# Patient Record
Sex: FEMALE | Race: WHITE | NOT HISPANIC OR LATINO | Employment: OTHER | URBAN - METROPOLITAN AREA
[De-identification: names, ages, dates, MRNs, and addresses within clinical notes are randomized per-mention and may not be internally consistent; named-entity substitution may affect disease eponyms.]

---

## 2017-11-07 ENCOUNTER — TRANSCRIBE ORDERS (OUTPATIENT)
Dept: MAMMOGRAPHY | Facility: HOSPITAL | Age: 67
End: 2017-11-07

## 2017-11-07 DIAGNOSIS — Z12.31 VISIT FOR SCREENING MAMMOGRAM: Primary | ICD-10-CM

## 2017-12-13 ENCOUNTER — HOSPITAL ENCOUNTER (OUTPATIENT)
Dept: MAMMOGRAPHY | Facility: HOSPITAL | Age: 67
Discharge: HOME | End: 2017-12-13
Attending: OBSTETRICS & GYNECOLOGY | Admitting: OBSTETRICS & GYNECOLOGY

## 2017-12-13 DIAGNOSIS — Z12.31 VISIT FOR SCREENING MAMMOGRAM: ICD-10-CM

## 2017-12-13 PROCEDURE — G0202 SCR MAMMO BI INCL CAD: HCPCS

## 2017-12-13 PROCEDURE — 77063 BREAST TOMOSYNTHESIS BI: CPT

## 2017-12-15 PROCEDURE — G0202 SCR MAMMO BI INCL CAD: HCPCS | Performed by: RADIOLOGY

## 2017-12-15 PROCEDURE — 77063 BREAST TOMOSYNTHESIS BI: CPT | Performed by: RADIOLOGY

## 2018-11-13 ENCOUNTER — TRANSCRIBE ORDERS (OUTPATIENT)
Dept: ADMINISTRATIVE | Facility: HOSPITAL | Age: 68
End: 2018-11-13

## 2018-11-13 DIAGNOSIS — Z12.31 VISIT FOR SCREENING MAMMOGRAM: Primary | ICD-10-CM

## 2019-01-02 ENCOUNTER — APPOINTMENT (OUTPATIENT)
Dept: MAMMOGRAPHY | Facility: HOSPITAL | Age: 69
End: 2019-01-02
Attending: FAMILY MEDICINE

## 2019-01-07 ENCOUNTER — TRANSCRIBE ORDERS (OUTPATIENT)
Dept: ADMINISTRATIVE | Facility: HOSPITAL | Age: 69
End: 2019-01-07

## 2019-01-07 DIAGNOSIS — Z12.31 VISIT FOR SCREENING MAMMOGRAM: Primary | ICD-10-CM

## 2019-02-14 RX ORDER — SODIUM, POTASSIUM,MAG SULFATES 17.5-3.13G
SOLUTION, RECONSTITUTED, ORAL ORAL
Qty: 2 BOTTLE | Refills: 0 | Status: SHIPPED | OUTPATIENT
Start: 2019-02-14 | End: 2020-03-24

## 2019-02-18 ENCOUNTER — HOSPITAL ENCOUNTER (OUTPATIENT)
Dept: MAMMOGRAPHY | Facility: HOSPITAL | Age: 69
Discharge: HOME | End: 2019-02-18
Attending: FAMILY MEDICINE | Admitting: FAMILY MEDICINE

## 2019-02-18 DIAGNOSIS — Z12.31 VISIT FOR SCREENING MAMMOGRAM: ICD-10-CM

## 2019-02-18 PROCEDURE — 77063 BREAST TOMOSYNTHESIS BI: CPT | Performed by: RADIOLOGY

## 2019-02-18 PROCEDURE — 77067 SCR MAMMO BI INCL CAD: CPT

## 2019-02-18 PROCEDURE — 77067 SCR MAMMO BI INCL CAD: CPT | Performed by: RADIOLOGY

## 2019-02-18 PROCEDURE — 77063 BREAST TOMOSYNTHESIS BI: CPT

## 2019-03-01 ENCOUNTER — OUTSIDE FACILITY SERVICE (OUTPATIENT)
Dept: GASTROENTEROLOGY | Facility: CLINIC | Age: 69
End: 2019-03-01

## 2019-03-01 ENCOUNTER — LAB REQUISITION (OUTPATIENT)
Dept: LAB | Facility: HOSPITAL | Age: 69
End: 2019-03-01

## 2019-03-01 DIAGNOSIS — Z12.11 ENCOUNTER FOR SCREENING FOR MALIGNANT NEOPLASM OF COLON: ICD-10-CM

## 2019-03-01 PROCEDURE — 88305 TISSUE EXAM BY PATHOLOGIST: CPT | Performed by: INTERNAL MEDICINE

## 2019-03-01 PROCEDURE — 45385 COLONOSCOPY W/LESION REMOVAL: CPT | Performed by: INTERNAL MEDICINE

## 2019-03-04 LAB
CYTO UR: NORMAL
LAB AP CASE REPORT: NORMAL
LAB AP CLINICAL INFORMATION: NORMAL
PATH REPORT.FINAL DX SPEC: NORMAL
PATH REPORT.GROSS SPEC: NORMAL

## 2019-11-19 ENCOUNTER — HOSPITAL ENCOUNTER (OUTPATIENT)
Dept: GENERAL RADIOLOGY | Facility: HOSPITAL | Age: 69
Discharge: HOME | End: 2019-11-19
Admitting: FAMILY MEDICINE

## 2019-11-19 ENCOUNTER — TRANSCRIBE ORDERS (OUTPATIENT)
Dept: ADMINISTRATIVE | Facility: HOSPITAL | Age: 69
End: 2019-11-19

## 2019-11-19 DIAGNOSIS — R06.00 DYSPNEA AND RESPIRATORY ABNORMALITIES: Primary | ICD-10-CM

## 2019-11-19 DIAGNOSIS — R06.89 DYSPNEA AND RESPIRATORY ABNORMALITIES: Primary | ICD-10-CM

## 2019-11-19 PROCEDURE — 71046 X-RAY EXAM CHEST 2 VIEWS: CPT

## 2020-01-17 ENCOUNTER — TRANSCRIBE ORDERS (OUTPATIENT)
Dept: ADMINISTRATIVE | Facility: HOSPITAL | Age: 70
End: 2020-01-17

## 2020-01-17 DIAGNOSIS — Z12.31 VISIT FOR SCREENING MAMMOGRAM: Primary | ICD-10-CM

## 2020-03-18 ENCOUNTER — APPOINTMENT (OUTPATIENT)
Dept: MAMMOGRAPHY | Facility: HOSPITAL | Age: 70
End: 2020-03-18

## 2020-03-24 ENCOUNTER — OFFICE VISIT (OUTPATIENT)
Dept: ENDOCRINOLOGY | Facility: CLINIC | Age: 70
End: 2020-03-24

## 2020-03-24 VITALS
OXYGEN SATURATION: 98 % | WEIGHT: 126 LBS | BODY MASS INDEX: 24.74 KG/M2 | DIASTOLIC BLOOD PRESSURE: 60 MMHG | SYSTOLIC BLOOD PRESSURE: 100 MMHG | HEIGHT: 60 IN | HEART RATE: 77 BPM

## 2020-03-24 DIAGNOSIS — E89.0 POSTABLATIVE HYPOTHYROIDISM: Primary | ICD-10-CM

## 2020-03-24 DIAGNOSIS — M81.0 AGE-RELATED OSTEOPOROSIS WITHOUT CURRENT PATHOLOGICAL FRACTURE: ICD-10-CM

## 2020-03-24 LAB
25(OH)D3 SERPL-MCNC: 74.7 NG/ML (ref 30–100)
ALBUMIN SERPL-MCNC: 4.9 G/DL (ref 3.5–5.2)
ANION GAP SERPL CALCULATED.3IONS-SCNC: 13.3 MMOL/L (ref 5–15)
BUN BLD-MCNC: 19 MG/DL (ref 8–23)
BUN/CREAT SERPL: 27.1 (ref 7–25)
CALCIUM SPEC-SCNC: 10.3 MG/DL (ref 8.6–10.5)
CHLORIDE SERPL-SCNC: 101 MMOL/L (ref 98–107)
CO2 SERPL-SCNC: 25.7 MMOL/L (ref 22–29)
CREAT BLD-MCNC: 0.7 MG/DL (ref 0.57–1)
GFR SERPL CREATININE-BSD FRML MDRD: 83 ML/MIN/1.73
GLUCOSE BLD-MCNC: 109 MG/DL (ref 65–99)
PHOSPHATE SERPL-MCNC: 3.6 MG/DL (ref 2.5–4.5)
POTASSIUM BLD-SCNC: 4.7 MMOL/L (ref 3.5–5.2)
PTH-INTACT SERPL-MCNC: 20.9 PG/ML (ref 15–65)
SODIUM BLD-SCNC: 140 MMOL/L (ref 136–145)
TSH SERPL DL<=0.05 MIU/L-ACNC: 0.58 UIU/ML (ref 0.27–4.2)

## 2020-03-24 PROCEDURE — 99204 OFFICE O/P NEW MOD 45 MIN: CPT | Performed by: INTERNAL MEDICINE

## 2020-03-24 PROCEDURE — 84443 ASSAY THYROID STIM HORMONE: CPT | Performed by: INTERNAL MEDICINE

## 2020-03-24 PROCEDURE — 83970 ASSAY OF PARATHORMONE: CPT | Performed by: INTERNAL MEDICINE

## 2020-03-24 PROCEDURE — 80069 RENAL FUNCTION PANEL: CPT | Performed by: INTERNAL MEDICINE

## 2020-03-24 PROCEDURE — 82306 VITAMIN D 25 HYDROXY: CPT | Performed by: INTERNAL MEDICINE

## 2020-03-24 RX ORDER — CHLORAL HYDRATE 500 MG
CAPSULE ORAL
COMMUNITY
End: 2022-10-03

## 2020-03-24 RX ORDER — AMLODIPINE BESYLATE 5 MG/1
TABLET ORAL
COMMUNITY
Start: 2020-03-04

## 2020-03-24 RX ORDER — SIMVASTATIN 20 MG
TABLET ORAL
COMMUNITY
Start: 2020-03-17 | End: 2021-03-29

## 2020-03-24 RX ORDER — CARVEDILOL 6.25 MG/1
TABLET ORAL
COMMUNITY
Start: 2020-02-25

## 2020-03-24 RX ORDER — LEVOTHYROXINE SODIUM 75 MCG
TABLET ORAL
COMMUNITY
Start: 2020-03-14 | End: 2020-03-27 | Stop reason: SDUPTHER

## 2020-03-24 RX ORDER — KETOCONAZOLE 20 MG/ML
SHAMPOO TOPICAL
COMMUNITY
Start: 2020-03-09 | End: 2021-09-03

## 2020-03-24 NOTE — PROGRESS NOTES
Chief Complaint   Patient presents with   • Hypothyroidism     Referred by Dr. Corby Cash    • Osteoporosis       HPI:   Keke Rodriguez is a 69 y.o.female referred by Corby Cash MD for further evaluation and management of her hypothyroidism and osteoporosis. Her history is as follows:    1) post-ablative hypothyroidism:  - diagnosed with Graves disease in her 40's. Eventually treated with PURVIS ablation after a trial of thionamides  - Has been taking Brand Synthroid for several years. Did not tolerate generic levothyroxine.     Had influenza vaccine in 09/2019. Labs 11/08/2019 were: TSH 0.07, free T4 1.92 (H) on Synthroid 88 mcg (did not feel well on this dose). Her dose was decreased to Synthroid 75 mcg.   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   - Due to concerns about jaw osteonecrosis, pt did not start Fosamax in 01/2019. Instead, has increased her calcium, vit D intake and has increased her weight bearing exercise over the past year.     Date L1 - L4 Total Hip, L Neck, L Total Hip, R Neck, R forearm FRAX   01/2019 T: -1.3 T: - 1.6 T: - 2.5 T: - 1.6 T: - 2.5  Major: 13.8%  Hip: 3.2%                         Other history:   - menopause began in her late 40's. Had hysterectomy and single oophorectomy in her 40's  - FMHx: pt's sister has osteoporosis  - no h/o fracture  - non-smoker     Review of Systems   Constitutional: Positive for fatigue.   HENT: Negative.    Eyes: Negative.    Respiratory:  Negative.    Cardiovascular: Negative.    Gastrointestinal: Negative.    Endocrine: Negative.    Genitourinary: Negative.    Musculoskeletal: Positive for arthralgias.   Skin: Negative.    Allergic/Immunologic: Negative.    Neurological: Negative.    Hematological: Negative.    Psychiatric/Behavioral: Negative.        Past Medical History:   Diagnosis Date   • Arthritis    • Dry skin    • H/O Graves' disease    • Hypertension    • Postablative hypothyroidism    • Romberg disease     Left    • Vision disorder     cataracts     family history includes Arthritis in her mother; Breast cancer (age of onset: 45) in her niece; Hyperlipidemia in her mother; Hypertension in her father; Osteoporosis in her sister; Stroke in her father; Thyroid disease in her mother.  Past Surgical History:   Procedure Laterality Date   • OOPHORECTOMY      UNILATERAL   • OTHER SURGICAL HISTORY Left     multiple facial reconstructions durning lifetime due to Tree rombergs dx , left   • TONSILLECTOMY      as a child      Social History     Tobacco Use   • Smoking status: Never Smoker   • Smokeless tobacco: Never Used   Substance Use Topics   • Alcohol use: Yes     Comment: social    • Drug use: Never     Outpatient Medications Prior to Visit   Medication Sig Dispense Refill   • amLODIPine (NORVASC) 5 MG tablet      • calcium citrate-vitamin d (CITRACAL) 200-250 MG-UNIT tablet tablet Take  by mouth Daily.     • carvedilol (COREG) 6.25 MG tablet      • Cholecalciferol (VITAMIN D-3 PO) Take 4,000 Units by mouth Daily.     • Glucosamine-MSM-Hyaluronic Acd (JOINT HEALTH PO) Take  by mouth.     • ketoconazole (NIZORAL) 2 % shampoo      • Omega-3 Fatty Acids (FISH OIL) 1000 MG capsule capsule Take  by mouth Daily With Breakfast.     • Red Yeast Rice Extract (RED YEAST RICE PO) Take  by mouth.     • simvastatin (ZOCOR) 20 MG tablet      • SYNTHROID 75 MCG tablet      • sodium-potassium-magnesium sulfates (SUPREP BOWEL PREP KIT) 17.5-3.13-1.6 GM/177ML  "solution oral solution Please follow the directions mailed to you by our office. If you have any questions call our office at [phone] 2 bottle 0     No facility-administered medications prior to visit.      Allergies   Allergen Reactions   • Contrast Dye Swelling     Pt cant have betadine    • Doxycycline Nausea Only     Pt also get high blood pressure    • Hydrochlorothiazide Swelling   • Statins Other (See Comments)     Pt has coughing, swelling in tongue, sensitivities , and severe muscle cramps   • Penicillins Rash       /60   Pulse 77   Ht 152.4 cm (60\")   Wt 57.2 kg (126 lb)   SpO2 98%   BMI 24.61 kg/m²   Physical Exam   Constitutional: She is oriented to person, place, and time. She appears well-developed. No distress.   HENT:   Head: Normocephalic.   Mouth/Throat: Oropharynx is clear and moist.   Left jaw s/p multiple reconstruction surgeries   Eyes: Pupils are equal, round, and reactive to light. Conjunctivae and EOM are normal.   Neck: No tracheal deviation present. No thyromegaly present.   No palpable thyroid nodules     Cardiovascular: Normal rate, regular rhythm and normal heart sounds.   No murmur heard.  Pulmonary/Chest: Effort normal and breath sounds normal. No respiratory distress.   Abdominal: Soft. Bowel sounds are normal. She exhibits no mass. There is no tenderness.   Lymphadenopathy:     She has no cervical adenopathy.   Neurological: She is alert and oriented to person, place, and time. No cranial nerve deficit.   Skin: Skin is warm and dry. She is not diaphoretic. No erythema.   Psychiatric: She has a normal mood and affect. Her behavior is normal.   Vitals reviewed.      LABS/IMAGING: outside records reviewed and summarized in HPI  Results for orders placed or performed in visit on 03/24/20   TSH   Result Value Ref Range    TSH 0.582 0.270 - 4.200 uIU/mL   PTH, Intact   Result Value Ref Range    PTH, Intact 20.9 15.0 - 65.0 pg/mL   Renal Function Panel   Result Value Ref " Range    Glucose 109 (H) 65 - 99 mg/dL    BUN 19 8 - 23 mg/dL    Creatinine 0.70 0.57 - 1.00 mg/dL    Sodium 140 136 - 145 mmol/L    Potassium 4.7 3.5 - 5.2 mmol/L    Chloride 101 98 - 107 mmol/L    CO2 25.7 22.0 - 29.0 mmol/L    Calcium 10.3 8.6 - 10.5 mg/dL    Albumin 4.90 3.50 - 5.20 g/dL    Phosphorus 3.6 2.5 - 4.5 mg/dL    Anion Gap 13.3 5.0 - 15.0 mmol/L    BUN/Creatinine Ratio 27.1 (H) 7.0 - 25.0    eGFR Non African Amer 83 >60 mL/min/1.73   Vitamin D 25 Hydroxy   Result Value Ref Range    25 Hydroxy, Vitamin D 74.7 30.0 - 100.0 ng/ml     ASSESSMENT/PLAN:    1) hypothyroidism due to Hashimoto's thyroiditis:   - clinically euthyroid on exam  - TSH checked today WNL  - Will continue Brand Synthroid 75 mcg daily. Have sent Rx through Synthroid Direct mail order program for reduced cost.   - Reviewed proper thyroid hormone administration, and factors to avoid that decrease medication potency and medication absorption.     2) Age-related osteoporosis without current fracture:  - Reviewed pt's DEXA scan from 01/2019 with patient. Although treatment with a bisphosphonate was recommended at that time to her by her PCP, she was hesitant to start medication at that time given her h/o Romberg's disease and the potential risk (although very low) for osteonecrosis on the jaw. She instead has increased her calcium intake, consistently taken vitamin D3, and has increased her weight-bearing exercise over the past year.   - I have checked her renal function panel, PTH and vitamin D 25-OH level today to rule out secondary causes of osteoporosis. The is no evidence of primary hyperparathyroidism.   - I have asked her to decrease her vit D supplement to 2000 IU daily (has been taking 4000 IU daily)  - She is to continue her current calcium regimen (dietary sources plus supplement)  - I've asked her to have a repeat BMD at the same facility this year to assess if her current regimen of weight-bearing exercise and calcium/vit D  supplementation has helped. After review of the imaging, we will discuss treatment options.     Pt scheduled to RTC in 6 months    Signed: Gaye Alejandra MD          Answers for HPI/ROS submitted by the patient on 3/17/2020   What is the primary reason for your visit?: Other  Please describe your symptoms.: Thyroid management--thyroid ablation 18+years ago., Would also like to discuss bone scan results  Have you had these symptoms before?: Yes  How long have you been having these symptoms?: Other  Please list any medications you are currently taking for this condition.: Synthroid 75 mcg tab. 1 daily, Amlodipine besylate 5 mg tab. 1 a.m., Carvedilol 6.25 mg tab 1 p.m.,, Simvastatin 20 mg tab. 1 tab 3 days a week (Mon, Wed, Fri), Vitamin D3 2,000 units 2 caps daily,, , SUPPLEMENTS: Red yeast rice, 1 tab 4 days a week (Tues, Thurs, Sat, Sun),  Omega -3 Fish OIl,, Calcium Magnesium Citrate plus Vit D3,  Baxyl liquid hyaluronan supplement  Please describe any probable cause for these symptoms. : thyroid ablation, , normal bones loss due to aging

## 2020-03-27 ENCOUNTER — TELEPHONE (OUTPATIENT)
Dept: ENDOCRINOLOGY | Facility: CLINIC | Age: 70
End: 2020-03-27

## 2020-03-27 RX ORDER — LEVOTHYROXINE SODIUM 75 MCG
75 TABLET ORAL DAILY
Qty: 90 TABLET | Refills: 3 | Status: SHIPPED | OUTPATIENT
Start: 2020-03-27 | End: 2020-04-03 | Stop reason: SDUPTHER

## 2020-03-27 NOTE — TELEPHONE ENCOUNTER
Spoke to patient about lab results. See clinic note from 03/24/2020 for details.   Gaye Alejandra MD

## 2020-03-31 PROBLEM — M81.0 AGE-RELATED OSTEOPOROSIS WITHOUT CURRENT PATHOLOGICAL FRACTURE: Status: ACTIVE | Noted: 2020-03-31

## 2020-04-03 ENCOUNTER — TELEPHONE (OUTPATIENT)
Dept: ENDOCRINOLOGY | Facility: CLINIC | Age: 70
End: 2020-04-03

## 2020-04-03 DIAGNOSIS — E89.0 POSTABLATIVE HYPOTHYROIDISM: ICD-10-CM

## 2020-04-03 RX ORDER — LEVOTHYROXINE SODIUM 75 MCG
75 TABLET ORAL DAILY
Qty: 90 TABLET | Refills: 3 | Status: SHIPPED | OUTPATIENT
Start: 2020-04-03 | End: 2021-03-23

## 2020-04-03 NOTE — TELEPHONE ENCOUNTER
Spoke to patient. Informed her I had already sent the Rx. Have sent another prescription again.   Signed: Gaye Alejandra MD

## 2020-05-07 ENCOUNTER — HOSPITAL ENCOUNTER (OUTPATIENT)
Dept: MAMMOGRAPHY | Facility: HOSPITAL | Age: 70
Discharge: HOME | End: 2020-05-07
Admitting: FAMILY MEDICINE

## 2020-05-07 DIAGNOSIS — Z12.31 VISIT FOR SCREENING MAMMOGRAM: ICD-10-CM

## 2020-05-07 PROCEDURE — 77063 BREAST TOMOSYNTHESIS BI: CPT

## 2020-05-07 PROCEDURE — 77067 SCR MAMMO BI INCL CAD: CPT

## 2020-05-07 PROCEDURE — 77067 SCR MAMMO BI INCL CAD: CPT | Performed by: RADIOLOGY

## 2020-05-07 PROCEDURE — 77063 BREAST TOMOSYNTHESIS BI: CPT | Performed by: RADIOLOGY

## 2020-05-11 ENCOUNTER — APPOINTMENT (OUTPATIENT)
Dept: MAMMOGRAPHY | Facility: HOSPITAL | Age: 70
End: 2020-05-11

## 2020-05-15 ENCOUNTER — HOSPITAL ENCOUNTER (OUTPATIENT)
Dept: MAMMOGRAPHY | Facility: HOSPITAL | Age: 70
Discharge: HOME | End: 2020-05-15
Admitting: RADIOLOGY

## 2020-05-15 DIAGNOSIS — R92.8 ABNORMAL MAMMOGRAM: ICD-10-CM

## 2020-05-15 PROCEDURE — 77065 DX MAMMO INCL CAD UNI: CPT

## 2020-05-15 PROCEDURE — 77065 DX MAMMO INCL CAD UNI: CPT | Performed by: RADIOLOGY

## 2020-05-15 PROCEDURE — G0279 TOMOSYNTHESIS, MAMMO: HCPCS

## 2020-05-15 PROCEDURE — G0279 TOMOSYNTHESIS, MAMMO: HCPCS | Performed by: RADIOLOGY

## 2020-09-04 ENCOUNTER — TELEPHONE (OUTPATIENT)
Dept: ENDOCRINOLOGY | Facility: CLINIC | Age: 70
End: 2020-09-04

## 2020-09-04 DIAGNOSIS — M81.0 AGE-RELATED OSTEOPOROSIS WITHOUT CURRENT PATHOLOGICAL FRACTURE: Primary | ICD-10-CM

## 2020-09-04 DIAGNOSIS — E89.0 POSTABLATIVE HYPOTHYROIDISM: ICD-10-CM

## 2020-09-04 NOTE — TELEPHONE ENCOUNTER
PLease let patient know that labs have been ordered. She can complete any time the week before her appointment. She can come to our clinic or any of the  lab drawn stations.  Thanks  MD

## 2020-09-04 NOTE — TELEPHONE ENCOUNTER
PATIENT HAS APPT SCHEDULED ON 9/28/2020. DO YOU WANT HER TO HAVE LABS DRAWN PRIOR TO THIS APPT?    CALL BACK [phone]

## 2020-09-18 ENCOUNTER — LAB (OUTPATIENT)
Dept: ENDOCRINOLOGY | Facility: CLINIC | Age: 70
End: 2020-09-18

## 2020-09-18 ENCOUNTER — LAB (OUTPATIENT)
Dept: LAB | Facility: HOSPITAL | Age: 70
End: 2020-09-18

## 2020-09-18 DIAGNOSIS — E89.0 POSTABLATIVE HYPOTHYROIDISM: ICD-10-CM

## 2020-09-18 DIAGNOSIS — M81.0 AGE-RELATED OSTEOPOROSIS WITHOUT CURRENT PATHOLOGICAL FRACTURE: ICD-10-CM

## 2020-09-18 LAB
25(OH)D3 SERPL-MCNC: 106 NG/ML (ref 30–100)
ALBUMIN SERPL-MCNC: 4.6 G/DL (ref 3.5–5.2)
ALBUMIN/GLOB SERPL: 1.9 G/DL
ALP SERPL-CCNC: 58 U/L (ref 39–117)
ALT SERPL W P-5'-P-CCNC: 15 U/L (ref 1–33)
ANION GAP SERPL CALCULATED.3IONS-SCNC: 9.7 MMOL/L (ref 5–15)
AST SERPL-CCNC: 23 U/L (ref 1–32)
BILIRUB SERPL-MCNC: 0.4 MG/DL (ref 0–1.2)
BUN SERPL-MCNC: 16 MG/DL (ref 8–23)
BUN/CREAT SERPL: 21.6 (ref 7–25)
CALCIUM SPEC-SCNC: 9.5 MG/DL (ref 8.6–10.5)
CHLORIDE SERPL-SCNC: 104 MMOL/L (ref 98–107)
CO2 SERPL-SCNC: 26.3 MMOL/L (ref 22–29)
CREAT SERPL-MCNC: 0.74 MG/DL (ref 0.57–1)
GFR SERPL CREATININE-BSD FRML MDRD: 78 ML/MIN/1.73
GLOBULIN UR ELPH-MCNC: 2.4 GM/DL
GLUCOSE SERPL-MCNC: 100 MG/DL (ref 65–99)
POTASSIUM SERPL-SCNC: 4.3 MMOL/L (ref 3.5–5.2)
PROT SERPL-MCNC: 7 G/DL (ref 6–8.5)
SODIUM SERPL-SCNC: 140 MMOL/L (ref 136–145)
TSH SERPL DL<=0.05 MIU/L-ACNC: 0.76 UIU/ML (ref 0.27–4.2)

## 2020-09-18 PROCEDURE — 84443 ASSAY THYROID STIM HORMONE: CPT | Performed by: INTERNAL MEDICINE

## 2020-09-18 PROCEDURE — 80053 COMPREHEN METABOLIC PANEL: CPT | Performed by: INTERNAL MEDICINE

## 2020-09-18 PROCEDURE — 82306 VITAMIN D 25 HYDROXY: CPT | Performed by: INTERNAL MEDICINE

## 2020-09-28 ENCOUNTER — OFFICE VISIT (OUTPATIENT)
Dept: ENDOCRINOLOGY | Facility: CLINIC | Age: 70
End: 2020-09-28

## 2020-09-28 VITALS
HEART RATE: 66 BPM | OXYGEN SATURATION: 98 % | DIASTOLIC BLOOD PRESSURE: 70 MMHG | BODY MASS INDEX: 24.74 KG/M2 | WEIGHT: 126 LBS | SYSTOLIC BLOOD PRESSURE: 126 MMHG | HEIGHT: 60 IN

## 2020-09-28 DIAGNOSIS — E67.3 HYPERVITAMINOSIS D: ICD-10-CM

## 2020-09-28 DIAGNOSIS — M81.0 AGE-RELATED OSTEOPOROSIS WITHOUT CURRENT PATHOLOGICAL FRACTURE: Primary | ICD-10-CM

## 2020-09-28 DIAGNOSIS — E89.0 POSTABLATIVE HYPOTHYROIDISM: ICD-10-CM

## 2020-09-28 PROCEDURE — 99213 OFFICE O/P EST LOW 20 MIN: CPT | Performed by: INTERNAL MEDICINE

## 2020-09-28 RX ORDER — ROSUVASTATIN CALCIUM 10 MG/1
10 TABLET, COATED ORAL DAILY
COMMUNITY

## 2020-10-08 ENCOUNTER — TELEPHONE (OUTPATIENT)
Dept: ENDOCRINOLOGY | Facility: CLINIC | Age: 70
End: 2020-10-08

## 2020-12-02 ENCOUNTER — LAB (OUTPATIENT)
Dept: ENDOCRINOLOGY | Facility: CLINIC | Age: 70
End: 2020-12-02

## 2020-12-02 ENCOUNTER — LAB (OUTPATIENT)
Dept: LAB | Facility: HOSPITAL | Age: 70
End: 2020-12-02

## 2020-12-02 DIAGNOSIS — M81.0 AGE-RELATED OSTEOPOROSIS WITHOUT CURRENT PATHOLOGICAL FRACTURE: ICD-10-CM

## 2020-12-02 PROCEDURE — 82306 VITAMIN D 25 HYDROXY: CPT

## 2020-12-03 LAB — 25(OH)D3 SERPL-MCNC: 54.7 NG/ML (ref 30–100)

## 2020-12-07 ENCOUNTER — TELEPHONE (OUTPATIENT)
Dept: ENDOCRINOLOGY | Facility: CLINIC | Age: 70
End: 2020-12-07

## 2020-12-07 NOTE — TELEPHONE ENCOUNTER
PATIENT IS REQUESTING A RETURN CALL REGARDING HER RECENT VIT D LAB RESULT. SHE STATES THAT SHE IS NOT TAKING A SUPPLEMENT AT THIS TIME.    CALL BACK [phone]

## 2020-12-07 NOTE — TELEPHONE ENCOUNTER
Spoke to patient about lab results. Pt was also sent a MyChart note which she hadn't seen yet.   Signed: Gaye Alejandra MD

## 2021-03-08 ENCOUNTER — TELEPHONE (OUTPATIENT)
Dept: ENDOCRINOLOGY | Facility: CLINIC | Age: 71
End: 2021-03-08

## 2021-03-08 DIAGNOSIS — E89.0 POSTABLATIVE HYPOTHYROIDISM: ICD-10-CM

## 2021-03-08 DIAGNOSIS — M81.0 AGE-RELATED OSTEOPOROSIS WITHOUT CURRENT PATHOLOGICAL FRACTURE: Primary | ICD-10-CM

## 2021-03-08 NOTE — TELEPHONE ENCOUNTER
I placed lab orders for the week before her appointment. She can complete any time.  Signed: Gaye Alejandra MD

## 2021-03-08 NOTE — TELEPHONE ENCOUNTER
PATIENT HAS GOTTEN THE FIRST COVID 19 VACCINE AND IS SCHEDULED TO HAVE SECOND VACCINE ON 3/20/21. SHE NEEDS TO SEE WHEN DR. HERNANDEZ THINKS SHE SHOULD HAVE LAB WORK DRAWN. BEFORE THE SECOND VACCINE OR AFTER THE SECOND VACCINE.SHE WILL NEED LAB ORDERS PLACED IN HER CHART TO HAVE LABS DRAWN.  PHONE NUMBER [phone]

## 2021-03-19 ENCOUNTER — LAB (OUTPATIENT)
Dept: LAB | Facility: HOSPITAL | Age: 71
End: 2021-03-19

## 2021-03-19 ENCOUNTER — LAB (OUTPATIENT)
Dept: ENDOCRINOLOGY | Facility: CLINIC | Age: 71
End: 2021-03-19

## 2021-03-19 DIAGNOSIS — M81.0 AGE-RELATED OSTEOPOROSIS WITHOUT CURRENT PATHOLOGICAL FRACTURE: ICD-10-CM

## 2021-03-19 DIAGNOSIS — E89.0 POSTABLATIVE HYPOTHYROIDISM: ICD-10-CM

## 2021-03-19 LAB
25(OH)D3 SERPL-MCNC: 79.8 NG/ML (ref 30–100)
TSH SERPL DL<=0.05 MIU/L-ACNC: 0.98 UIU/ML (ref 0.27–4.2)

## 2021-03-19 PROCEDURE — 84443 ASSAY THYROID STIM HORMONE: CPT | Performed by: INTERNAL MEDICINE

## 2021-03-19 PROCEDURE — 82306 VITAMIN D 25 HYDROXY: CPT | Performed by: INTERNAL MEDICINE

## 2021-03-23 DIAGNOSIS — E89.0 POSTABLATIVE HYPOTHYROIDISM: ICD-10-CM

## 2021-03-23 RX ORDER — LEVOTHYROXINE SODIUM 75 MCG
TABLET ORAL
Qty: 90 TABLET | Refills: 3 | Status: SHIPPED | OUTPATIENT
Start: 2021-03-23 | End: 2022-03-07 | Stop reason: SDUPTHER

## 2021-03-29 ENCOUNTER — OFFICE VISIT (OUTPATIENT)
Dept: ENDOCRINOLOGY | Facility: CLINIC | Age: 71
End: 2021-03-29

## 2021-03-29 VITALS
BODY MASS INDEX: 24.94 KG/M2 | WEIGHT: 127 LBS | HEIGHT: 60 IN | SYSTOLIC BLOOD PRESSURE: 140 MMHG | HEART RATE: 82 BPM | OXYGEN SATURATION: 98 % | DIASTOLIC BLOOD PRESSURE: 62 MMHG

## 2021-03-29 DIAGNOSIS — M81.0 AGE-RELATED OSTEOPOROSIS WITHOUT CURRENT PATHOLOGICAL FRACTURE: Primary | ICD-10-CM

## 2021-03-29 DIAGNOSIS — E89.0 POSTABLATIVE HYPOTHYROIDISM: ICD-10-CM

## 2021-03-29 PROCEDURE — 99213 OFFICE O/P EST LOW 20 MIN: CPT | Performed by: INTERNAL MEDICINE

## 2021-03-29 NOTE — PROGRESS NOTES
Chief Complaint   Patient presents with   • Osteoporosis     follow up   • Hypothyroidism       HPI:   Keke Rodriguez is a 70 y.o.female who returns to endocrine clinic for follow-up evaluation and management of her hypothyroidism and osteoporosis. Last visit 09/28/2020. Her history is as follows:    Interim Events:  - Next DEXA - BMD in July 2021  - Taking VIt D3 4000 IU daily, 1200 mg liquid calcium citrate per day  - to see OMFS regarding her jaw    1) post-ablative hypothyroidism:  - diagnosed with Graves disease in her 40's. Eventually treated with PURVIS ablation after a trial of thionamides  - Has been taking Brand Synthroid for several years. Did not tolerate generic levothyroxine.     Had influenza vaccine in 09/2019. Labs 11/08/2019 were: TSH 0.07, free T4 1.92 (H) on Synthroid 88 mcg (did not feel well on this dose). Her dose was decreased to Synthroid 75 mcg.   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   - Due to concerns about jaw osteonecrosis, pt did not start Fosamax in 01/2019. Instead, has increased her calcium, vit D intake and has increased her weight bearing exercise since 01/2019  - renal function panel, PTH and vitamin D 25-OH level in 03/2020 were normal  - Repeat DEXA 07/01/2020 at [facility] [place]      Date L1 - L4 Total Hip, L Neck, L Total Hip, R Neck, R forearm FRAX   01/2019 T: -1.3 T: - 1.6 T: - 2.5 T: - 1.6  "T: - 2.5  Major: 13.8%  Hip: 3.2%   07/2020 T: -1.2  T: -2.5  T: -2.5                 Other history:   - menopause began in her late 40's. Had hysterectomy and single oophorectomy in her 40's  - FMHx: pt's sister has osteoporosis  - no h/o fracture  - non-smoker     Review of Systems   Constitutional: Negative for fatigue.   HENT: Negative.    Eyes: Negative.    Respiratory: Negative.    Cardiovascular: Negative.    Gastrointestinal: Negative.    Endocrine: Negative.    Genitourinary: Negative.    Musculoskeletal: Positive for arthralgias.   Skin: Negative.    Allergic/Immunologic: Negative.    Neurological: Negative.    Hematological: Negative.    Psychiatric/Behavioral: Negative.        The following portions of the patient's history were reviewed and updated as appropriate: allergies, current medications, past family history, past medical history, past social history, past surgical history and problem list.      /62   Pulse 82   Ht 152.4 cm (60\")   Wt 57.6 kg (127 lb)   SpO2 98%   BMI 24.80 kg/m²   Physical Exam   Constitutional: She is oriented to person, place, and time. She appears well-developed. No distress.   HENT:   Head: Normocephalic.   Eyes: Pupils are equal, round, and reactive to light. Conjunctivae are normal.   Neck: No tracheal deviation present. No thyromegaly present.   No palpable thyroid nodules     Cardiovascular: Normal rate, regular rhythm and normal heart sounds.   No murmur heard.  Pulmonary/Chest: Effort normal and breath sounds normal. No respiratory distress.   Abdominal: Soft. Bowel sounds are normal. She exhibits no mass. There is no abdominal tenderness.   Lymphadenopathy:     She has no cervical adenopathy.   Neurological: She is alert and oriented to person, place, and time. No cranial nerve deficit.   Skin: Skin is warm and dry. She is not diaphoretic. No erythema.   Psychiatric: Her behavior is normal.   Vitals reviewed.    LABS/IMAGING: outside records reviewed and " summarized in HPI  Results for orders placed or performed in visit on 03/19/21   TSH    Specimen: Blood   Result Value Ref Range    TSH 0.979 0.270 - 4.200 uIU/mL   Vitamin D 25 Hydroxy    Specimen: Blood   Result Value Ref Range    25 Hydroxy, Vitamin D 79.8 30.0 - 100.0 ng/ml     ASSESSMENT/PLAN:  1) Age-related osteoporosis without current fracture:  - Reviewed pt's DEXA scan from 01/2019 and 07/2020 with patient. Although treatment with a bisphosphonate or Prolia was recommended, she was very hesitant to start medication at this time given her h/o Romberg's disease and the potential risk (although very low) for osteonecrosis on the jaw.  Again, reiterated that the risk is very low    She instead would like to continue calcium intake, vitamin D3, and increased her weight-bearing exercise for now. Will discuss with OMFS.  - see below regarding vitamin D level  - She is to continue her current calcium regimen (dietary sources plus supplement)  - I've asked her to have a repeat BMD at the same facility in 07/2021 to assess if her current regimen of weight-bearing exercise and calcium/vit D supplementation slows progression.   - Reviewed my recommendations to start treatment if her T-scores worsen. She states she will consider it.    2) hypervitaminosis D:   Vit D 25 OH level normal at 79.8  - hold vit D3 for 1 months and restart 4000 IU daily    3) hypothyroidism due to Hashimoto's thyroiditis:   - clinically euthyroid on exam  - TSH checked today WNL  - Will continue Brand Synthroid 75 mcg daily through Synthroid Direct mail order program for reduced cost.   - Reviewed proper thyroid hormone administration, and factors to avoid that decrease medication potency and medication absorption.     Pt scheduled to RTC in 6 months    Signed: Gaye Alejandra MD

## 2021-06-01 ENCOUNTER — TRANSCRIBE ORDERS (OUTPATIENT)
Dept: ADMINISTRATIVE | Facility: HOSPITAL | Age: 71
End: 2021-06-01

## 2021-06-01 DIAGNOSIS — Z12.31 VISIT FOR SCREENING MAMMOGRAM: Primary | ICD-10-CM

## 2021-06-09 ENCOUNTER — TELEPHONE (OUTPATIENT)
Dept: ENDOCRINOLOGY | Facility: CLINIC | Age: 71
End: 2021-06-09

## 2021-06-09 NOTE — TELEPHONE ENCOUNTER
Pt called to say that Dr Alejandra wanted her to have a bone density test but she had one on 7/2020 they won't pay for one until 2 years.  Does she really need to get it done now or can she wait to get it done  Please call the pt back  [phone]

## 2021-06-09 NOTE — TELEPHONE ENCOUNTER
There is a scanned dexa in [facility] from 3/2020. Should pt wait until next year to complete a new dexa?

## 2021-06-11 NOTE — TELEPHONE ENCOUNTER
PATIENT HAS HAD A DEATH IN FAMILY AND DOES NOT WANT TO SCHEDULE BONE DENSITY. SHE WILL DISCUSS BONE DENSITY WHEN SHE COMES BACK TO SEE DR. HERNANDEZ IN September.

## 2021-07-14 ENCOUNTER — HOSPITAL ENCOUNTER (OUTPATIENT)
Dept: MAMMOGRAPHY | Facility: HOSPITAL | Age: 71
Discharge: HOME | End: 2021-07-14
Admitting: OBSTETRICS & GYNECOLOGY

## 2021-07-14 DIAGNOSIS — Z12.31 VISIT FOR SCREENING MAMMOGRAM: ICD-10-CM

## 2021-07-14 PROCEDURE — 77067 SCR MAMMO BI INCL CAD: CPT

## 2021-07-14 PROCEDURE — 77063 BREAST TOMOSYNTHESIS BI: CPT

## 2021-07-14 PROCEDURE — 77067 SCR MAMMO BI INCL CAD: CPT | Performed by: RADIOLOGY

## 2021-07-14 PROCEDURE — 77063 BREAST TOMOSYNTHESIS BI: CPT | Performed by: RADIOLOGY

## 2021-09-01 ENCOUNTER — TELEPHONE (OUTPATIENT)
Dept: ENDOCRINOLOGY | Facility: CLINIC | Age: 71
End: 2021-09-01

## 2021-09-01 DIAGNOSIS — E89.0 POSTABLATIVE HYPOTHYROIDISM: ICD-10-CM

## 2021-09-01 DIAGNOSIS — M81.0 AGE-RELATED OSTEOPOROSIS WITHOUT CURRENT PATHOLOGICAL FRACTURE: Primary | ICD-10-CM

## 2021-09-01 NOTE — TELEPHONE ENCOUNTER
Pt called wants to have her Thyroid labs done before her appt on 09/29/21. Pt will come to the office to have her labs drawn please notify pt when order are in.

## 2021-09-08 ENCOUNTER — TELEPHONE (OUTPATIENT)
Dept: ENDOCRINOLOGY | Facility: CLINIC | Age: 71
End: 2021-09-08

## 2021-09-08 NOTE — TELEPHONE ENCOUNTER
PATIENT STATES THAT DR HERNANDEZ WANTED HE RTO GET A REPEAT DEXA THIS YEAR. SHE STATES THAT HER INSURANCE WILL ONLY COVER THIS ONCE EVERY TWO YEARS UNLESS DOCUMENTATION IS PROVIDED FROM PROVIDER STATING THAT THE REPEAT DEXA IS MEDICALLY NECESSARY. PATIENT WOULD NEED THIS TYPE OF DOCUMENTATION TO PRESENT FOR INSURANCE WHEN SCHEDULING THE APPT.     CALL BACK [phone]

## 2021-09-09 NOTE — TELEPHONE ENCOUNTER
Spoke with patient. Will postpone next DEXA until 07/2022. Pt will complete labs already scheduled for 09/24/2021. Moved f/u appointment to 07/18/2022.  Signed: Gaye Alejandra MD

## 2021-09-21 ENCOUNTER — LAB (OUTPATIENT)
Dept: ENDOCRINOLOGY | Facility: CLINIC | Age: 71
End: 2021-09-21

## 2021-09-21 ENCOUNTER — LAB (OUTPATIENT)
Dept: LAB | Facility: HOSPITAL | Age: 71
End: 2021-09-21

## 2021-09-21 DIAGNOSIS — E89.0 POSTABLATIVE HYPOTHYROIDISM: ICD-10-CM

## 2021-09-21 DIAGNOSIS — M81.0 AGE-RELATED OSTEOPOROSIS WITHOUT CURRENT PATHOLOGICAL FRACTURE: ICD-10-CM

## 2021-09-21 LAB
25(OH)D3 SERPL-MCNC: 75.3 NG/ML
ALBUMIN SERPL-MCNC: 4.6 G/DL (ref 3.5–5.2)
ALBUMIN/GLOB SERPL: 1.7 G/DL
ALP SERPL-CCNC: 64 U/L (ref 39–117)
ALT SERPL W P-5'-P-CCNC: 9 U/L (ref 1–33)
ANION GAP SERPL CALCULATED.3IONS-SCNC: 9 MMOL/L (ref 5–15)
AST SERPL-CCNC: 20 U/L (ref 1–32)
BILIRUB SERPL-MCNC: 0.4 MG/DL (ref 0–1.2)
BUN SERPL-MCNC: 13 MG/DL (ref 8–23)
BUN/CREAT SERPL: 22.8 (ref 7–25)
CALCIUM SPEC-SCNC: 9.7 MG/DL (ref 8.6–10.5)
CHLORIDE SERPL-SCNC: 105 MMOL/L (ref 98–107)
CO2 SERPL-SCNC: 26 MMOL/L (ref 22–29)
CREAT SERPL-MCNC: 0.57 MG/DL (ref 0.57–1)
GFR SERPL CREATININE-BSD FRML MDRD: 105 ML/MIN/1.73
GLOBULIN UR ELPH-MCNC: 2.7 GM/DL
GLUCOSE SERPL-MCNC: 94 MG/DL (ref 65–99)
PHOSPHATE SERPL-MCNC: 3.8 MG/DL (ref 2.5–4.5)
POTASSIUM SERPL-SCNC: 4.4 MMOL/L (ref 3.5–5.2)
PROT SERPL-MCNC: 7.3 G/DL (ref 6–8.5)
SODIUM SERPL-SCNC: 140 MMOL/L (ref 136–145)
TSH SERPL DL<=0.05 MIU/L-ACNC: 0.66 UIU/ML (ref 0.27–4.2)

## 2021-09-21 PROCEDURE — 80053 COMPREHEN METABOLIC PANEL: CPT

## 2021-09-21 PROCEDURE — 82306 VITAMIN D 25 HYDROXY: CPT

## 2021-09-21 PROCEDURE — 84443 ASSAY THYROID STIM HORMONE: CPT

## 2021-09-21 PROCEDURE — 84100 ASSAY OF PHOSPHORUS: CPT

## 2021-09-22 ENCOUNTER — TELEPHONE (OUTPATIENT)
Dept: ENDOCRINOLOGY | Facility: CLINIC | Age: 71
End: 2021-09-22

## 2021-09-22 NOTE — TELEPHONE ENCOUNTER
PATIENT WOULD LIKE A CALL BACK TO DISCUSS LAB RESULTS AND POSSIBLE MEDICATION CHANGES. PHONE NUMBER [phone]

## 2022-03-07 DIAGNOSIS — E89.0 POSTABLATIVE HYPOTHYROIDISM: ICD-10-CM

## 2022-03-08 ENCOUNTER — TELEPHONE (OUTPATIENT)
Dept: ENDOCRINOLOGY | Facility: CLINIC | Age: 72
End: 2022-03-08

## 2022-03-08 DIAGNOSIS — E89.0 POSTABLATIVE HYPOTHYROIDISM: ICD-10-CM

## 2022-03-08 RX ORDER — LEVOTHYROXINE SODIUM 75 MCG
75 TABLET ORAL DAILY
Qty: 90 TABLET | Refills: 3 | Status: SHIPPED | OUTPATIENT
Start: 2022-03-08 | End: 2022-03-08 | Stop reason: SDUPTHER

## 2022-03-08 RX ORDER — LEVOTHYROXINE SODIUM 75 MCG
TABLET ORAL
Qty: 90 TABLET | Refills: 3 | OUTPATIENT
Start: 2022-03-08

## 2022-03-08 RX ORDER — LEVOTHYROXINE SODIUM 75 MCG
75 TABLET ORAL DAILY
Qty: 90 TABLET | Refills: 3 | Status: SHIPPED | OUTPATIENT
Start: 2022-03-08 | End: 2022-05-23

## 2022-03-08 NOTE — TELEPHONE ENCOUNTER
Discussed natural history of ocular condition and the patient understands findings, prognosis and need for follow up care. PT CALLED STATING WE SENT HER SYNTHROID RX IN TO THE WRONG PHARM. SHE REQUESTED WE SEND SYNTHROID RX IN TO [facility] IN [place], [place] (LISTED).

## 2022-05-23 DIAGNOSIS — E89.0 POSTABLATIVE HYPOTHYROIDISM: ICD-10-CM

## 2022-05-23 RX ORDER — LEVOTHYROXINE SODIUM 75 MCG
TABLET ORAL
Qty: 90 TABLET | Refills: 3 | Status: SHIPPED | OUTPATIENT
Start: 2022-05-23 | End: 2023-04-03

## 2022-05-31 ENCOUNTER — TRANSCRIBE ORDERS (OUTPATIENT)
Dept: ADMINISTRATIVE | Facility: HOSPITAL | Age: 72
End: 2022-05-31

## 2022-05-31 DIAGNOSIS — Z12.31 VISIT FOR SCREENING MAMMOGRAM: Primary | ICD-10-CM

## 2022-08-01 ENCOUNTER — HOSPITAL ENCOUNTER (OUTPATIENT)
Dept: MAMMOGRAPHY | Facility: HOSPITAL | Age: 72
Discharge: HOME | End: 2022-08-01
Admitting: OBSTETRICS & GYNECOLOGY

## 2022-08-01 DIAGNOSIS — Z12.31 VISIT FOR SCREENING MAMMOGRAM: ICD-10-CM

## 2022-08-01 PROCEDURE — 77067 SCR MAMMO BI INCL CAD: CPT | Performed by: RADIOLOGY

## 2022-08-01 PROCEDURE — 77063 BREAST TOMOSYNTHESIS BI: CPT

## 2022-08-01 PROCEDURE — 77063 BREAST TOMOSYNTHESIS BI: CPT | Performed by: RADIOLOGY

## 2022-08-01 PROCEDURE — 77067 SCR MAMMO BI INCL CAD: CPT

## 2022-09-28 ENCOUNTER — TELEPHONE (OUTPATIENT)
Dept: ENDOCRINOLOGY | Facility: CLINIC | Age: 72
End: 2022-09-28

## 2022-10-03 DIAGNOSIS — M81.0 AGE-RELATED OSTEOPOROSIS WITHOUT CURRENT PATHOLOGICAL FRACTURE: ICD-10-CM

## 2022-10-03 DIAGNOSIS — E89.0 POSTABLATIVE HYPOTHYROIDISM: Primary | ICD-10-CM

## 2022-10-07 ENCOUNTER — LAB (OUTPATIENT)
Dept: ENDOCRINOLOGY | Facility: CLINIC | Age: 72
End: 2022-10-07

## 2022-10-07 ENCOUNTER — LAB (OUTPATIENT)
Dept: LAB | Facility: HOSPITAL | Age: 72
End: 2022-10-07

## 2022-10-07 DIAGNOSIS — M81.0 AGE-RELATED OSTEOPOROSIS WITHOUT CURRENT PATHOLOGICAL FRACTURE: ICD-10-CM

## 2022-10-07 DIAGNOSIS — E89.0 POSTABLATIVE HYPOTHYROIDISM: ICD-10-CM

## 2022-10-07 LAB
25(OH)D3 SERPL-MCNC: 85.3 NG/ML (ref 30–100)
ALBUMIN SERPL-MCNC: 4.2 G/DL (ref 3.5–5.2)
ANION GAP SERPL CALCULATED.3IONS-SCNC: 11.1 MMOL/L (ref 5–15)
BUN SERPL-MCNC: 13 MG/DL (ref 8–23)
BUN/CREAT SERPL: 14.9 (ref 7–25)
CALCIUM SPEC-SCNC: 9.5 MG/DL (ref 8.6–10.5)
CHLORIDE SERPL-SCNC: 101 MMOL/L (ref 98–107)
CO2 SERPL-SCNC: 28.9 MMOL/L (ref 22–29)
CREAT SERPL-MCNC: 0.87 MG/DL (ref 0.57–1)
EGFRCR SERPLBLD CKD-EPI 2021: 71.8 ML/MIN/1.73
GLUCOSE SERPL-MCNC: 81 MG/DL (ref 65–99)
PHOSPHATE SERPL-MCNC: 3.6 MG/DL (ref 2.5–4.5)
POTASSIUM SERPL-SCNC: 4 MMOL/L (ref 3.5–5.2)
SODIUM SERPL-SCNC: 141 MMOL/L (ref 136–145)
TSH SERPL DL<=0.05 MIU/L-ACNC: 1 UIU/ML (ref 0.27–4.2)

## 2022-10-07 PROCEDURE — 84443 ASSAY THYROID STIM HORMONE: CPT | Performed by: INTERNAL MEDICINE

## 2022-10-07 PROCEDURE — 80069 RENAL FUNCTION PANEL: CPT | Performed by: INTERNAL MEDICINE

## 2022-10-07 PROCEDURE — 82306 VITAMIN D 25 HYDROXY: CPT | Performed by: INTERNAL MEDICINE

## 2022-10-12 ENCOUNTER — OFFICE VISIT (OUTPATIENT)
Dept: ENDOCRINOLOGY | Facility: CLINIC | Age: 72
End: 2022-10-12

## 2022-10-12 VITALS
OXYGEN SATURATION: 98 % | BODY MASS INDEX: 23.56 KG/M2 | HEART RATE: 67 BPM | DIASTOLIC BLOOD PRESSURE: 60 MMHG | HEIGHT: 60 IN | WEIGHT: 120 LBS | SYSTOLIC BLOOD PRESSURE: 108 MMHG

## 2022-10-12 DIAGNOSIS — M81.0 AGE-RELATED OSTEOPOROSIS WITHOUT CURRENT PATHOLOGICAL FRACTURE: ICD-10-CM

## 2022-10-12 DIAGNOSIS — E89.0 POSTABLATIVE HYPOTHYROIDISM: Primary | ICD-10-CM

## 2022-10-12 PROCEDURE — 99213 OFFICE O/P EST LOW 20 MIN: CPT | Performed by: INTERNAL MEDICINE

## 2022-10-12 NOTE — PROGRESS NOTES
Chief Complaint   Patient presents with   • Hypothyroidism     Follow up        HPI:   Keke Rodriguez is a 72 y.o.female who returns to endocrine clinic for follow-up evaluation and management of her hypothyroidism and osteoporosis. Last visit 03/29/2021. Her history is as follows:    Interim Events:  - Completed DEXA - BMD on July 05, 2022, see below.  - Taking VIt D3 4000 IU daily and liquid calium  - s/p tooth extraction from left lower jaw by [facility]  - Doing weight bearing exercises daily     1) post-ablative hypothyroidism:  - diagnosed with Graves disease in her 40's. Eventually treated with PURVIS ablation after a trial of thionamides  - Has been taking Brand Synthroid for several years. Did not tolerate generic levothyroxine.     Had influenza vaccine in 09/2019. Labs 11/08/2019 were: TSH 0.07, free T4 1.92 (H) on Synthroid 88 mcg (did not feel well on this dose). Her dose was decreased to Synthroid 75 mcg.   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 Does not have a h/o bone grafts to her knowledge.    - Due to concerns about jaw osteonecrosis, pt did not start Fosamax in 01/2019. Instead, has increased her calcium, vit D intake and has increased her weight bearing exercise since 01/2019  - renal function panel, PTH and vitamin D 25-OH level in 03/2020 were normal  - [facility] 07/01/2020 [facility]      Date L1 - L4 Total Hip, L Neck, L Total Hip, R Neck, R forearm FRAX   01/2019 T: -1.3 T: - 1.6 T: - 2.5 T: - 1.6 T: - 2.5  Major: 13.8%  Hip: 3.2%   07/2020 T: -1.2  T: -2.5  T: -2.5     07/2022   T: -1.3     T: -1.7 T: -2.4  Major: 13.9%  Hip: 3.3%     Other history:   - menopause began in her late 40's. Had single oophorectomy in her 40's.   - FMHx: pt's sister has osteoporosis  - no h/o fracture  - non-smoker     Review of Systems   Constitutional: Negative for fatigue.   HENT: Negative.    Eyes: Negative.    Respiratory: Negative.    Cardiovascular: Negative.    Gastrointestinal: Negative.    Endocrine: Negative.    Genitourinary: Negative.    Musculoskeletal: Positive for arthralgias.   Skin: Negative.    Allergic/Immunologic: Negative.    Neurological: Negative.    Hematological: Negative.    Psychiatric/Behavioral: Negative.        The following portions of the patient's history were reviewed and updated as appropriate: allergies, current medications, past family history, past medical history, past social history, past surgical history and problem list.      /60   Pulse 67   Ht 152.4 cm (60\")   Wt 54.4 kg (120 lb)   SpO2 98%   BMI 23.44 kg/m²   Physical Exam   Constitutional: She is oriented to person, place, and time. She appears well-developed. No distress.   HENT:   Head: Normocephalic.   Eyes: Pupils are equal, round, and reactive to light. Conjunctivae are normal.   Neck: No tracheal deviation present. No thyromegaly present.   No palpable thyroid nodules     Cardiovascular: Normal rate, regular rhythm and normal heart sounds.   No murmur heard.  Pulmonary/Chest: Effort normal and breath sounds normal. No respiratory distress.   Abdominal: Soft. Bowel sounds are normal. She exhibits no mass. There is no abdominal tenderness.   Lymphadenopathy:     She has no cervical adenopathy.   Neurological: She is alert and oriented to person, place, and time. No cranial nerve deficit. "   Skin: Skin is warm and dry. She is not diaphoretic. No erythema.   Psychiatric: Her behavior is normal.   Vitals reviewed.    LABS/IMAGING: outside records reviewed and summarized in HPI  Results for orders placed or performed in visit on 10/07/22   TSH    Specimen: Blood   Result Value Ref Range    TSH 0.997 0.270 - 4.200 uIU/mL   Renal Function Panel    Specimen: Blood   Result Value Ref Range    Glucose 81 65 - 99 mg/dL    BUN 13 8 - 23 mg/dL    Creatinine 0.87 0.57 - 1.00 mg/dL    Sodium 141 136 - 145 mmol/L    Potassium 4.0 3.5 - 5.2 mmol/L    Chloride 101 98 - 107 mmol/L    CO2 28.9 22.0 - 29.0 mmol/L    Calcium 9.5 8.6 - 10.5 mg/dL    Albumin 4.20 3.50 - 5.20 g/dL    Phosphorus 3.6 2.5 - 4.5 mg/dL    Anion Gap 11.1 5.0 - 15.0 mmol/L    BUN/Creatinine Ratio 14.9 7.0 - 25.0    eGFR 71.8 >60.0 mL/min/1.73   Vitamin D 25 Hydroxy    Specimen: Blood   Result Value Ref Range    25 Hydroxy, Vitamin D 85.3 30.0 - 100.0 ng/ml     ASSESSMENT/PLAN:  1) Age-related osteoporosis without current fracture:  - Reviewed pt's DEXA scan from 01/2019 through 07/2022 with patient. Although treatment with a bisphosphonate or Prolia was recommended, she was very hesitant to start medication at this time given her h/o Tree Romberg Syndrome and the potential risk (although very low) for osteonecrosis on the jaw.  Again, reiterated that the risk is very low    She instead would like to continue calcium intake, vitamin D3, and increased her weight-bearing exercise for now.   - see below regarding vitamin D level  - She is to continue her current calcium regimen (dietary sources plus supplement)  - I've asked her to have a repeat BMD at the same facility in 07/2024 .   - Reviewed my recommendations to start treatment if her T-scores worsen. She states she will consider it.    2) vitamin D deficiency:   Vit D 25 OH level was 85.3  - Asked pt to decrease D3 to 2000 IU daily    3) hypothyroidism due to Hashimoto's thyroiditis:   -  clinically euthyroid on exam  - TSH checked recently WNL  - Will continue Brand Synthroid 75 mcg daily through [facility] mail order program for reduced cost.   - Reviewed proper thyroid hormone administration, and factors to avoid that decrease medication potency and medication absorption.     Pt scheduled to RTC in 12 months    Signed: Gaye Alejandra MD

## 2022-11-22 ENCOUNTER — TELEPHONE (OUTPATIENT)
Dept: ENDOCRINOLOGY | Facility: CLINIC | Age: 72
End: 2022-11-22

## 2023-04-03 DIAGNOSIS — E89.0 POSTABLATIVE HYPOTHYROIDISM: ICD-10-CM

## 2023-04-03 RX ORDER — LEVOTHYROXINE SODIUM 75 MCG
TABLET ORAL
Qty: 90 TABLET | Refills: 3 | Status: SHIPPED | OUTPATIENT
Start: 2023-04-03

## 2023-05-18 ENCOUNTER — TELEPHONE (OUTPATIENT)
Dept: ENDOCRINOLOGY | Facility: CLINIC | Age: 73
End: 2023-05-18
Payer: MEDICARE

## 2023-05-18 ENCOUNTER — LAB (OUTPATIENT)
Dept: ENDOCRINOLOGY | Facility: CLINIC | Age: 73
End: 2023-05-18
Payer: MEDICARE

## 2023-05-18 DIAGNOSIS — E89.0 POSTABLATIVE HYPOTHYROIDISM: Primary | ICD-10-CM

## 2023-05-18 DIAGNOSIS — E89.0 POSTABLATIVE HYPOTHYROIDISM: ICD-10-CM

## 2023-05-18 LAB — TSH SERPL DL<=0.05 MIU/L-ACNC: 0.56 UIU/ML (ref 0.27–4.2)

## 2023-05-18 PROCEDURE — 84443 ASSAY THYROID STIM HORMONE: CPT | Performed by: INTERNAL MEDICINE

## 2023-05-18 PROCEDURE — 36415 COLL VENOUS BLD VENIPUNCTURE: CPT | Performed by: INTERNAL MEDICINE

## 2023-05-18 NOTE — TELEPHONE ENCOUNTER
"----- Message from Keke Rodriguez sent at 2023  8:55 AM EDT -----  Regarding: Thyroid test  Contact: [phone]  Good Morning, I am a patient or yours with past history of thyroid ablation.  You have had me managed on Synthroid 75mcg for some time.  I am wondering if I should have a thyroid lab test?  I am feeling a bit \"out of sorts\", fatigue, sensitivity to heat, more than usual urination, overall just exhausted.  I have been under a lot of stress this year with my brother just passing, selling his home, settling his trust and my  had open heart surgery!! Yes, I think that should do it! This most likely is temporary but not generally feeling well is not normal for me.  Thank  you.  cell     [birth year]-52  Keke Rodriguez  "

## 2023-08-09 ENCOUNTER — HOSPITAL ENCOUNTER (OUTPATIENT)
Dept: MAMMOGRAPHY | Facility: HOSPITAL | Age: 73
Discharge: HOME | End: 2023-08-09
Admitting: OBSTETRICS & GYNECOLOGY
Payer: MEDICARE

## 2023-08-09 DIAGNOSIS — Z12.31 VISIT FOR SCREENING MAMMOGRAM: ICD-10-CM

## 2023-08-09 PROCEDURE — 77063 BREAST TOMOSYNTHESIS BI: CPT

## 2023-08-09 PROCEDURE — 77067 SCR MAMMO BI INCL CAD: CPT

## 2023-10-16 ENCOUNTER — OFFICE VISIT (OUTPATIENT)
Dept: ENDOCRINOLOGY | Facility: CLINIC | Age: 73
End: 2023-10-16
Payer: MEDICARE

## 2023-10-16 VITALS
HEART RATE: 73 BPM | SYSTOLIC BLOOD PRESSURE: 128 MMHG | HEIGHT: 60 IN | DIASTOLIC BLOOD PRESSURE: 72 MMHG | BODY MASS INDEX: 25.13 KG/M2 | WEIGHT: 128 LBS | OXYGEN SATURATION: 98 %

## 2023-10-16 DIAGNOSIS — E55.9 VITAMIN D DEFICIENCY: ICD-10-CM

## 2023-10-16 DIAGNOSIS — E89.0 POSTABLATIVE HYPOTHYROIDISM: Primary | ICD-10-CM

## 2023-10-16 DIAGNOSIS — M81.0 AGE-RELATED OSTEOPOROSIS WITHOUT CURRENT PATHOLOGICAL FRACTURE: ICD-10-CM

## 2023-10-16 LAB
25(OH)D3 SERPL-MCNC: 70.1 NG/ML (ref 30–100)
ALBUMIN SERPL-MCNC: 4.8 G/DL (ref 3.5–5.2)
ALBUMIN/GLOB SERPL: 1.8 G/DL
ALP SERPL-CCNC: 65 U/L (ref 39–117)
ALT SERPL W P-5'-P-CCNC: 12 U/L (ref 1–33)
ANION GAP SERPL CALCULATED.3IONS-SCNC: 11 MMOL/L (ref 5–15)
AST SERPL-CCNC: 19 U/L (ref 1–32)
BILIRUB SERPL-MCNC: 0.4 MG/DL (ref 0–1.2)
BUN SERPL-MCNC: 13 MG/DL (ref 8–23)
BUN/CREAT SERPL: 17.3 (ref 7–25)
CALCIUM SPEC-SCNC: 10.3 MG/DL (ref 8.6–10.5)
CHLORIDE SERPL-SCNC: 105 MMOL/L (ref 98–107)
CO2 SERPL-SCNC: 27 MMOL/L (ref 22–29)
CREAT SERPL-MCNC: 0.75 MG/DL (ref 0.57–1)
EGFRCR SERPLBLD CKD-EPI 2021: 85.2 ML/MIN/1.73
GLOBULIN UR ELPH-MCNC: 2.7 GM/DL
GLUCOSE SERPL-MCNC: 75 MG/DL (ref 65–99)
POTASSIUM SERPL-SCNC: 3.8 MMOL/L (ref 3.5–5.2)
PROT SERPL-MCNC: 7.5 G/DL (ref 6–8.5)
SODIUM SERPL-SCNC: 143 MMOL/L (ref 136–145)
TSH SERPL DL<=0.05 MIU/L-ACNC: 1.48 UIU/ML (ref 0.27–4.2)

## 2023-10-16 PROCEDURE — 82306 VITAMIN D 25 HYDROXY: CPT | Performed by: INTERNAL MEDICINE

## 2023-10-16 PROCEDURE — 3074F SYST BP LT 130 MM HG: CPT | Performed by: INTERNAL MEDICINE

## 2023-10-16 PROCEDURE — 84443 ASSAY THYROID STIM HORMONE: CPT | Performed by: INTERNAL MEDICINE

## 2023-10-16 PROCEDURE — 99214 OFFICE O/P EST MOD 30 MIN: CPT | Performed by: INTERNAL MEDICINE

## 2023-10-16 PROCEDURE — 3078F DIAST BP <80 MM HG: CPT | Performed by: INTERNAL MEDICINE

## 2023-10-16 PROCEDURE — 36415 COLL VENOUS BLD VENIPUNCTURE: CPT | Performed by: INTERNAL MEDICINE

## 2023-10-16 PROCEDURE — 80053 COMPREHEN METABOLIC PANEL: CPT | Performed by: INTERNAL MEDICINE

## 2023-10-16 NOTE — PROGRESS NOTES
Chief Complaint   Patient presents with    Hypothyroidism     Follow up        HPI:   Keke Rodriguez is a 73 y.o.female who returns to endocrine clinic for follow-up evaluation and management of her hypothyroidism and osteoporosis. Last visit 10/12/2022. Her history is as follows:    Interim Events:  - Is strength training 4 days/week and walking daily  - Has noted more fatigue over the past 3-4 months. Occasional sleep disturbance.     1) post-ablative hypothyroidism:  - diagnosed with Graves disease in her 40's. Eventually treated with PURVIS ablation after a trial of thionamides  - Has been taking Brand Synthroid for several years. Did not tolerate generic levothyroxine.     Had influenza vaccine in 09/2019. Labs 11/08/2019 were: TSH 0.07, free T4 1.92 (H) on Synthroid 88 mcg (did not feel well on this dose). Her dose was decreased to Synthroid 75 mcg.   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 Does not have a h/o bone grafts to her knowledge.    - Due to concerns about jaw osteonecrosis, pt did not start Fosamax in 01/2019. Instead, has increased her calcium, vit D intake and has increased her weight bearing exercise since 01/2019  - renal function panel, PTH and vitamin D 25-OH level in 03/2020 were normal  - [place] [facility] [place]      Date L1 - L4 Total Hip, L Neck, L Total Hip, R Neck, R forearm FRAX   01/2019 T: -1.3 T: - 1.6 T: - 2.5 T: - 1.6 T: - 2.5  " Major: 13.8%  Hip: 3.2%   07/2020 T: -1.2  T: -2.5  T: -2.5     07/2022   T: -1.3     T: -1.7 T: -2.4  Major: 13.9%  Hip: 3.3%     Other history:   - menopause began in her late 40's. Had single oophorectomy in her 40's.   - FMHx: pt's sister has osteoporosis  - no h/o fracture  - non-smoker     Review of Systems   Constitutional:  Positive for fatigue.   HENT: Negative.     Eyes: Negative.    Respiratory: Negative.     Cardiovascular: Negative.    Gastrointestinal: Negative.    Endocrine: Negative.    Genitourinary: Negative.    Musculoskeletal:  Positive for arthralgias.   Skin: Negative.    Allergic/Immunologic: Negative.    Neurological: Negative.    Hematological: Negative.    Psychiatric/Behavioral:  Positive for sleep disturbance (occasional).        The following portions of the patient's history were reviewed and updated as appropriate: allergies, current medications, past family history, past medical history, past social history, past surgical history and problem list.      /72   Pulse 73   Ht 152.4 cm (60\")   Wt 58.1 kg (128 lb)   SpO2 98%   BMI 25.00 kg/m²   Physical Exam   Constitutional: She is oriented to person, place, and time. She appears well-developed. No distress.   HENT:   Head: Normocephalic.   Eyes: Pupils are equal, round, and reactive to light. Conjunctivae are normal.   Neck: No tracheal deviation present. No thyromegaly present.   No palpable thyroid nodules     Cardiovascular: Normal rate, regular rhythm and normal heart sounds.   No murmur heard.  Pulmonary/Chest: Effort normal and breath sounds normal. No respiratory distress.   Abdominal: Soft. Bowel sounds are normal. She exhibits no mass. There is no abdominal tenderness.   Lymphadenopathy:     She has no cervical adenopathy.   Neurological: She is alert and oriented to person, place, and time. No cranial nerve deficit.   Skin: Skin is warm and dry. She is not diaphoretic. No erythema.   Psychiatric: Her behavior is " normal.   Vitals reviewed.    LABS/IMAGING: outside records reviewed and summarized in HPI  Results for orders placed or performed in visit on 10/16/23   TSH    Specimen: Blood   Result Value Ref Range    TSH 1.480 0.270 - 4.200 uIU/mL   Comprehensive Metabolic Panel    Specimen: Blood   Result Value Ref Range    Glucose 75 65 - 99 mg/dL    BUN 13 8 - 23 mg/dL    Creatinine 0.75 0.57 - 1.00 mg/dL    Sodium 143 136 - 145 mmol/L    Potassium 3.8 3.5 - 5.2 mmol/L    Chloride 105 98 - 107 mmol/L    CO2 27.0 22.0 - 29.0 mmol/L    Calcium 10.3 8.6 - 10.5 mg/dL    Total Protein 7.5 6.0 - 8.5 g/dL    Albumin 4.8 3.5 - 5.2 g/dL    ALT (SGPT) 12 1 - 33 U/L    AST (SGOT) 19 1 - 32 U/L    Alkaline Phosphatase 65 39 - 117 U/L    Total Bilirubin 0.4 0.0 - 1.2 mg/dL    Globulin 2.7 gm/dL    A/G Ratio 1.8 g/dL    BUN/Creatinine Ratio 17.3 7.0 - 25.0    Anion Gap 11.0 5.0 - 15.0 mmol/L    eGFR 85.2 >60.0 mL/min/1.73   Vitamin D,25-Hydroxy    Specimen: Arm, Left; Blood   Result Value Ref Range    25 Hydroxy, Vitamin D 70.1 30.0 - 100.0 ng/ml     ASSESSMENT/PLAN:  1) Age-related osteoporosis without current fracture:  - Reviewed pt's DEXA scan from 01/2019 through 07/2022 with patient. Although treatment with a bisphosphonate or Prolia was recommended, she was very hesitant to start medication at this time given her h/o Tree Romberg Syndrome and the potential risk (although low) for osteonecrosis on the jaw. Have discussed that the risk is low    She instead would like to continue calcium intake, vitamin D3, and her weight-bearing exercise for now.   - see below regarding vitamin D level  - She is to continue her current calcium regimen (dietary sources plus supplement)  - I've asked her to have a repeat BMD at the same facility in 07/2024 .   - Reviewed my recommendations to start treatment if her T-scores worsen. She states she will consider it.    2) vitamin D deficiency:   Vit D 25 OH level was 70.1  - Asked pt to continue D3  2000 IU daily    3) hypothyroidism due to Hashimoto's thyroiditis:   - clinically euthyroid on exam  - TSH checked recently WNL at 1.480  - Will continue Brand Synthroid 75 mcg daily through [facility] mail order program for reduced cost.   - Reviewed proper thyroid hormone administration, and factors to avoid that decrease medication potency and medication absorption.     Pt scheduled to RTC in 12 months    Signed: Gaye Alejandra MD

## 2024-04-18 DIAGNOSIS — E89.0 POSTABLATIVE HYPOTHYROIDISM: ICD-10-CM

## 2024-04-18 RX ORDER — LEVOTHYROXINE SODIUM 75 MCG
TABLET ORAL
Qty: 90 TABLET | Refills: 3 | Status: SHIPPED | OUTPATIENT
Start: 2024-04-18

## 2024-05-15 DIAGNOSIS — E89.0 POSTABLATIVE HYPOTHYROIDISM: Primary | ICD-10-CM

## 2024-05-16 ENCOUNTER — LAB (OUTPATIENT)
Dept: ENDOCRINOLOGY | Facility: CLINIC | Age: 74
End: 2024-05-16
Payer: MEDICARE

## 2024-05-16 DIAGNOSIS — E89.0 POSTABLATIVE HYPOTHYROIDISM: ICD-10-CM

## 2024-05-16 LAB
T4 FREE SERPL-MCNC: 1.81 NG/DL (ref 0.93–1.7)
TSH SERPL DL<=0.05 MIU/L-ACNC: 0.67 UIU/ML (ref 0.27–4.2)

## 2024-05-16 PROCEDURE — 84443 ASSAY THYROID STIM HORMONE: CPT | Performed by: INTERNAL MEDICINE

## 2024-05-16 PROCEDURE — 36415 COLL VENOUS BLD VENIPUNCTURE: CPT | Performed by: INTERNAL MEDICINE

## 2024-05-16 PROCEDURE — 84439 ASSAY OF FREE THYROXINE: CPT | Performed by: INTERNAL MEDICINE

## 2024-05-20 ENCOUNTER — TELEPHONE (OUTPATIENT)
Dept: ENDOCRINOLOGY | Facility: CLINIC | Age: 74
End: 2024-05-20
Payer: MEDICARE

## 2024-05-20 NOTE — TELEPHONE ENCOUNTER
Spoke to patient about lab results. Advised that Synthroid dose of 75 mcg was appropriate. Advised to try OCT slowFe on M, W, F to see if this helps with her fatigue.   Electronically Signed: Gaye Alejandra MD

## 2024-08-05 ENCOUNTER — TRANSCRIBE ORDERS (OUTPATIENT)
Dept: ADMINISTRATIVE | Facility: HOSPITAL | Age: 74
End: 2024-08-05
Payer: MEDICARE

## 2024-08-05 DIAGNOSIS — Z12.31 SCREENING MAMMOGRAM FOR BREAST CANCER: Primary | ICD-10-CM

## 2024-10-15 ENCOUNTER — HOSPITAL ENCOUNTER (OUTPATIENT)
Dept: MAMMOGRAPHY | Facility: HOSPITAL | Age: 74
Discharge: HOME | End: 2024-10-15
Admitting: OBSTETRICS & GYNECOLOGY
Payer: MEDICARE

## 2024-10-15 DIAGNOSIS — Z12.31 SCREENING MAMMOGRAM FOR BREAST CANCER: ICD-10-CM

## 2024-10-15 PROCEDURE — 77063 BREAST TOMOSYNTHESIS BI: CPT

## 2024-10-15 PROCEDURE — 77067 SCR MAMMO BI INCL CAD: CPT

## 2024-10-21 ENCOUNTER — OFFICE VISIT (OUTPATIENT)
Dept: ENDOCRINOLOGY | Facility: CLINIC | Age: 74
End: 2024-10-21
Payer: MEDICARE

## 2024-10-21 VITALS
SYSTOLIC BLOOD PRESSURE: 132 MMHG | HEART RATE: 72 BPM | WEIGHT: 130 LBS | BODY MASS INDEX: 25.52 KG/M2 | DIASTOLIC BLOOD PRESSURE: 60 MMHG | HEIGHT: 60 IN

## 2024-10-21 DIAGNOSIS — M81.0 AGE-RELATED OSTEOPOROSIS WITHOUT CURRENT PATHOLOGICAL FRACTURE: ICD-10-CM

## 2024-10-21 DIAGNOSIS — E89.0 POSTABLATIVE HYPOTHYROIDISM: Primary | ICD-10-CM

## 2024-10-21 DIAGNOSIS — E55.9 VITAMIN D DEFICIENCY: ICD-10-CM

## 2024-10-21 PROCEDURE — 82306 VITAMIN D 25 HYDROXY: CPT | Performed by: INTERNAL MEDICINE

## 2024-10-21 PROCEDURE — 36415 COLL VENOUS BLD VENIPUNCTURE: CPT | Performed by: INTERNAL MEDICINE

## 2024-10-21 PROCEDURE — 84443 ASSAY THYROID STIM HORMONE: CPT | Performed by: INTERNAL MEDICINE

## 2024-10-21 PROCEDURE — 99214 OFFICE O/P EST MOD 30 MIN: CPT | Performed by: INTERNAL MEDICINE

## 2024-10-21 PROCEDURE — 3075F SYST BP GE 130 - 139MM HG: CPT | Performed by: INTERNAL MEDICINE

## 2024-10-21 PROCEDURE — 3078F DIAST BP <80 MM HG: CPT | Performed by: INTERNAL MEDICINE

## 2024-10-21 NOTE — PROGRESS NOTES
Chief Complaint   Patient presents with    Hypothyroidism       HPI:   Keke Rodriguez is a 74 y.o.female who returns to endocrine clinic for follow-up evaluation and management of her hypothyroidism and osteoporosis. Last visit 10/16/2023. Her history is as follows:    Interim Events:  - Is strength training 4 days/week and walking daily  - Has been overall feeling okay  - DEXA scan completed in 08/2024, see below    1) post-ablative hypothyroidism:  - diagnosed with Graves disease in her 40's. Eventually treated with PURVIS ablation after a trial of thionamides  - Has been taking Brand Synthroid for several years. Did not tolerate generic levothyroxine.     Had influenza vaccine in 09/2019. Labs 11/08/2019 were: TSH 0.07, free T4 1.92 (H) on Synthroid 88 mcg (did not feel well on this dose). Her dose was decreased to Synthroid 75 mcg.   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 Does not have a h/o bone grafts to her knowledge.    - Due to concerns about jaw osteonecrosis, pt did not start Fosamax in 01/2019. Instead, has increased her calcium, vit D intake and has increased her weight bearing exercise since 01/2019  - renal function panel, PTH and vitamin D 25-OH level in 03/2020 were normal  - [place] [facility] [place]      Date L1 - L4 Total Hip, L Neck, L Total Hip, R Neck, R forearm FRAX   01/2019 T: -1.3 T: - 1.6 T: - 2.5 T: - 1.6 T: - 2.5  Major: 13.8%  Hip:  "3.2%   07/2020 T: -1.2  T: -2.5  T: -2.5     07/2022  Lunar Prod T: -1.3     T: -1.7 T: -2.4  Major: 13.9%  Hip: 3.3%   08/2024  Lunar Prod T: -1.3  BMD 1.035  T: -2.6  BMD 0.680 T: -1.5  BMD 0.815 T: -2.6  0.679  Major: 16.5%  Hip: 4.8%     Other history:   - menopause began in her late 40's. Had single oophorectomy in her 40's.   - FMHx: pt's sister has osteoporosis  - no h/o fracture  - non-smoker     Review of Systems   Constitutional:  Positive for fatigue (intermittent).   HENT: Negative.     Eyes: Negative.    Respiratory: Negative.     Cardiovascular: Negative.    Gastrointestinal: Negative.    Endocrine: Negative.    Genitourinary: Negative.    Musculoskeletal:  Positive for arthralgias.   Skin: Negative.    Allergic/Immunologic: Negative.    Neurological: Negative.    Hematological: Negative.    Psychiatric/Behavioral:  Positive for sleep disturbance (occasional).      The following portions of the patient's history were reviewed and updated as appropriate: allergies, current medications, past family history, past medical history, past social history, past surgical history and problem list.    /60   Pulse 72   Ht 152.4 cm (60\")   Wt 59 kg (130 lb)   BMI 25.39 kg/m²   Physical Exam   Constitutional: She is oriented to person, place, and time. She appears well-developed. No distress.   HENT:   Head: Normocephalic.   Eyes: Pupils are equal, round, and reactive to light. Conjunctivae are normal.   Neck: No tracheal deviation present. No thyromegaly present.   No palpable thyroid nodules     Cardiovascular: Normal rate, regular rhythm and normal heart sounds.   No murmur heard.  Pulmonary/Chest: Effort normal and breath sounds normal. No respiratory distress.   Abdominal: Soft. Bowel sounds are normal. She exhibits no mass. There is no abdominal tenderness.   Lymphadenopathy:     She has no cervical adenopathy.   Neurological: She is alert and oriented to person, place, and time. No cranial nerve " deficit.   Skin: Skin is warm and dry. She is not diaphoretic. No erythema.   Psychiatric: Her behavior is normal.   Vitals reviewed.    LABS/IMAGING: outside records reviewed and summarized in HPI  Results for orders placed or performed in visit on 10/21/24   TSH    Collection Time: 10/21/24  2:59 PM    Specimen: Arm, Left; Blood   Result Value Ref Range    TSH 2.250 0.270 - 4.200 uIU/mL   Vitamin D,25-Hydroxy    Collection Time: 10/21/24  2:59 PM    Specimen: Arm, Left; Blood   Result Value Ref Range    25 Hydroxy, Vitamin D 62.6 30.0 - 100.0 ng/ml     ASSESSMENT/PLAN:  1) Age-related osteoporosis without current fracture:  - Reviewed pt's DEXA scan from 01/2019 through 08/2024 with patient. Although treatment with a bisphosphonate or Prolia was recommended, she was very hesitant to start medication at this time given her h/o Tree Romberg Syndrome and the potential risk (although low) for osteonecrosis on the jaw. Have discussed that the risk is low    She instead would like to continue calcium intake, vitamin D3, and her weight-bearing exercise for now.   - see below regarding vitamin D level  - She is to continue her current calcium regimen (dietary sources plus supplement)  - I've asked her to have a repeat BMD at the same facility in 08/2026 .   - Reviewed my recommendations to start treatment if her T-scores worsen. She states she will consider it.    2) vitamin D deficiency:   Vit D 25 OH level was 62.6  - Asked pt to continue D3 2000 IU daily    3) hypothyroidism due to Hashimoto's thyroiditis:   - clinically euthyroid on exam  - TSH checked today was normal at 2.250  - Will continue Brand Synthroid 75 mcg daily through [facility] mail order program for reduced cost.   - Reviewed proper thyroid hormone administration, and factors to avoid that decrease medication potency and medication absorption.     Pt scheduled to RTC in 12 months    Electronically Signed: Gaye Alejandra MD    ADDENDUM:   - pt  contacted me regarding increasing her Synthroid dose to 88 mcg. Sent in Rx for higher dose but discussed my concerns that the 88 mcg may be supratheraputic.   - 11/15/2024: Pt because to develop high BP on the 88 mcg and asked if she could decrease back to 75 mcg. Dose was changed back to Synthroid 75 mcg. Will have pt repeat TFTs in 4-5 weeks after restarting the 75 mcg dose.   Electronically Signed: Gaye Alejandra MD

## 2024-10-22 LAB
25(OH)D3 SERPL-MCNC: 62.6 NG/ML (ref 30–100)
TSH SERPL DL<=0.05 MIU/L-ACNC: 2.25 UIU/ML (ref 0.27–4.2)

## 2024-11-01 RX ORDER — LEVOTHYROXINE SODIUM 88 MCG
88 TABLET ORAL EVERY MORNING
Qty: 90 TABLET | Refills: 3 | Status: SHIPPED | OUTPATIENT
Start: 2024-11-01 | End: 2024-11-18 | Stop reason: DRUGHIGH

## 2024-11-17 ENCOUNTER — PATIENT MESSAGE (OUTPATIENT)
Dept: ENDOCRINOLOGY | Facility: CLINIC | Age: 74
End: 2024-11-17
Payer: MEDICARE

## 2024-11-18 DIAGNOSIS — E89.0 POSTABLATIVE HYPOTHYROIDISM: Primary | ICD-10-CM

## 2024-11-18 RX ORDER — LEVOTHYROXINE SODIUM 75 MCG
75 TABLET ORAL EVERY MORNING
Start: 2024-11-18 | End: 2025-11-18

## 2024-12-19 ENCOUNTER — LAB (OUTPATIENT)
Dept: ENDOCRINOLOGY | Facility: CLINIC | Age: 74
End: 2024-12-19
Payer: MEDICARE

## 2024-12-19 DIAGNOSIS — E89.0 POSTABLATIVE HYPOTHYROIDISM: ICD-10-CM

## 2024-12-19 LAB
T4 FREE SERPL-MCNC: 1.75 NG/DL (ref 0.92–1.68)
TSH SERPL DL<=0.05 MIU/L-ACNC: 1.34 UIU/ML (ref 0.27–4.2)

## 2024-12-19 PROCEDURE — 84443 ASSAY THYROID STIM HORMONE: CPT | Performed by: INTERNAL MEDICINE

## 2024-12-19 PROCEDURE — 84439 ASSAY OF FREE THYROXINE: CPT | Performed by: INTERNAL MEDICINE

## 2024-12-19 PROCEDURE — 36415 COLL VENOUS BLD VENIPUNCTURE: CPT | Performed by: INTERNAL MEDICINE

## 2025-05-13 DIAGNOSIS — E89.0 POSTABLATIVE HYPOTHYROIDISM: ICD-10-CM

## 2025-05-13 RX ORDER — LEVOTHYROXINE SODIUM 75 MCG
75 TABLET ORAL EVERY MORNING
Qty: 90 TABLET | Refills: 1 | Status: SHIPPED | OUTPATIENT
Start: 2025-05-13

## 2025-05-13 NOTE — TELEPHONE ENCOUNTER
Rx Refill Note  Requested Prescriptions      No prescriptions requested or ordered in this encounter        Last office visit with prescribing clinician: 10/21/2024      Next office visit with prescribing clinician: 10/28/2025       Colleen Peralta (Jodi)  05/13/25, 12:51 EDT

## 2025-08-05 DIAGNOSIS — E89.0 POSTABLATIVE HYPOTHYROIDISM: Primary | ICD-10-CM
